# Patient Record
Sex: FEMALE | URBAN - METROPOLITAN AREA
[De-identification: names, ages, dates, MRNs, and addresses within clinical notes are randomized per-mention and may not be internally consistent; named-entity substitution may affect disease eponyms.]

---

## 2020-05-05 ENCOUNTER — OFFICE VISIT (OUTPATIENT)
Dept: PRIMARY CARE CLINIC | Age: 40
End: 2020-05-05

## 2020-05-05 VITALS — HEART RATE: 85 BPM | TEMPERATURE: 96.6 F | OXYGEN SATURATION: 98 %

## 2020-05-05 PROCEDURE — 99213 OFFICE O/P EST LOW 20 MIN: CPT | Performed by: NURSE PRACTITIONER

## 2020-05-07 LAB
SARS-COV-2: NOT DETECTED
SOURCE: NORMAL

## 2020-12-29 NOTE — PROGRESS NOTES
dry.   Neurological:      General: No focal deficit present. Mental Status: She is alert. Mental status is at baseline. Test ordered:    [x]COVID    _________  []Strep    ___________  []Flu       _________    Diagnosis:     []Flu  []Strep Throat  []Uncertain Viral Respiratory Illness  []Possible COVID-19  [x]Exposure COVID-19  []Other    PLAN:  []Referred to emergency department/ respiratory dept. for evaluation      Discharge home with verbal and or written instructions for:  [x]Preventing the spread of Coronavirus   []Flu management  []Strep throat management  []Possible COVID-19 exposure with self-quarantine, isolation instructions and management of symptoms  []Follow-up with primary care physician or emergency department if worsens  []Evaluation per physician/nurse practitioner in clinic      1. Suspected COVID-19 virus infection  COVID-19 swab complete at clinic and sent to lab for testing.  Quarantine order in place, patient verbalized understanding.    - COVID-19 Ambulatory continue home meds